# Patient Record
(demographics unavailable — no encounter records)

---

## 2024-11-29 NOTE — PHYSICAL EXAM
[No Acute Distress] : no acute distress [Well Nourished] : well nourished [Well Developed] : well developed [Well-Appearing] : well-appearing [No Respiratory Distress] : no respiratory distress  [No Focal Deficits] : no focal deficits [Normal Affect] : the affect was normal [Alert and Oriented x3] : oriented to person, place, and time

## 2024-11-29 NOTE — PLAN
[FreeTextEntry1] : 1. Supportive care at home with OTC meds and hydration 2. Follow up with pcp as needed 3. Pt prescribed azithromycin 5 days, benzonatate prn 4. Pt provided with instructions to be seen in person at urgent care or emergency department if symptoms worsen

## 2024-11-29 NOTE — ASSESSMENT
[FreeTextEntry1] : Pt presents with constellation of symptoms most consistent with bronchitis. While exam is limited due to telemedicine aspect of evaluation, patient has benign appearance without signs of respiratory compromise or instability. Low concern for pneumonia, sepsis, severe infection. Due to pt's immunocrompromised state from SLE along with duration of greater than 1 week of symptoms, it is reasonable to add antibiotics to her regimen. Will start her on azithromycin and add benzonatate tablets. Pt otherwise given supportive care instructions and isolation instructions and was told which symptoms should prompt a repeat visit and which symptoms should prompt a call to an ambulance or trip to ED. Pt encouraged to follow up with their primary doctor.

## 2024-11-29 NOTE — HISTORY OF PRESENT ILLNESS
[Home] : at home, [unfilled] , at the time of the visit. [Other Location: e.g. Home (Enter Location, City,State)___] : at [unfilled] [Verbal consent obtained from patient] : the patient, [unfilled] [FreeTextEntry8] : Pt with history of SLE here with concern for bronchitis. Pt states for the past week she has had a persistent cough with congestion. Pt denies any fever, chills, sob, n/v. Pt has been told to increase her baseline 40mg of prednisone daily to 60mg by her pcp to help with her symptoms and she has been using her albuterol inhaler. She also has tried OTC delsym but despite this she has been unable to get any relief of her symptoms thus prompting the call today.